# Patient Record
Sex: MALE | Race: WHITE | NOT HISPANIC OR LATINO | Employment: PART TIME | URBAN - METROPOLITAN AREA
[De-identification: names, ages, dates, MRNs, and addresses within clinical notes are randomized per-mention and may not be internally consistent; named-entity substitution may affect disease eponyms.]

---

## 2019-01-15 ENCOUNTER — OFFICE VISIT (OUTPATIENT)
Dept: MEDICAL GROUP | Facility: MEDICAL CENTER | Age: 29
End: 2019-01-15
Payer: COMMERCIAL

## 2019-01-15 VITALS
WEIGHT: 208 LBS | DIASTOLIC BLOOD PRESSURE: 70 MMHG | HEART RATE: 75 BPM | BODY MASS INDEX: 28.17 KG/M2 | RESPIRATION RATE: 16 BRPM | HEIGHT: 72 IN | SYSTOLIC BLOOD PRESSURE: 110 MMHG | OXYGEN SATURATION: 98 % | TEMPERATURE: 97.5 F

## 2019-01-15 DIAGNOSIS — J30.2 SEASONAL ALLERGIES: ICD-10-CM

## 2019-01-15 DIAGNOSIS — F33.42 RECURRENT MAJOR DEPRESSIVE DISORDER, IN FULL REMISSION (HCC): ICD-10-CM

## 2019-01-15 PROCEDURE — 99203 OFFICE O/P NEW LOW 30 MIN: CPT | Performed by: NURSE PRACTITIONER

## 2019-01-15 RX ORDER — VENLAFAXINE HYDROCHLORIDE 37.5 MG/1
37.5 CAPSULE, EXTENDED RELEASE ORAL DAILY
COMMUNITY
End: 2019-01-15 | Stop reason: SDUPTHER

## 2019-01-15 RX ORDER — VENLAFAXINE HYDROCHLORIDE 37.5 MG/1
37.5 CAPSULE, EXTENDED RELEASE ORAL DAILY
Qty: 90 CAP | Refills: 3 | Status: SHIPPED | OUTPATIENT
Start: 2019-01-15 | End: 2019-07-15 | Stop reason: SDUPTHER

## 2019-01-15 NOTE — PROGRESS NOTES
"CC: establish care/medication refill      Mirza Villatoro is a 28 y.o. male here to establish care and to discuss the evaluation and management of:    Has been several years since last PCP    Depression  Has been on Effexor for about a year an a half. Tolerates well. 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 No suicidal thoughts or ideations. No side effects from Effexor. Has not been to counseling. States he feels \"good\".'    Seasonal allergies  Stable at this time.     ROS:  Denies any Headache, Blurred Vision, Confusion, Chest pain,  Shortness of breath,  Abdominal pain, Changes of bowel or bladder, Hematuria, Hematochezia, Lower ext. edema, Fevers, Nights sweats, Weight Changes, Focal weakness or numbness.  And all other systems are negative.      Current Outpatient Prescriptions:   •  venlafaxine XR (EFFEXOR XR) 37.5 MG CAPSULE SR 24 HR, Take 1 Cap by mouth every day., Disp: 90 Cap, Rfl: 3    No Known Allergies    Past Medical History:   Diagnosis Date   • Depression     on Effexor     History reviewed. No pertinent surgical history.  Family History   Problem Relation Age of Onset   • Depression Mother    • Anxiety disorder Mother    • Other Mother         pre-diabetes   • Obesity Father    • Alzheimer's Disease Maternal Grandmother    • Diabetes Maternal Grandmother    • Heart Disease Maternal Grandfather    • No Known Problems Paternal Grandmother    • No Known Problems Paternal Grandfather      Social History     Social History   • Marital status: Single     Spouse name: N/A   • Number of children: N/A   • Years of education: N/A     Occupational History   • Not on file.     Social History Main Topics   • Smoking status: Never Smoker   • Smokeless tobacco: Never Used   • Alcohol use 0.0 oz/week      Comment: sparingly   • Drug use: No   • Sexual activity: No     Other Topics Concern   • Not on file     Social History Narrative   • No narrative on file       Objective:     Vitals: /70   Pulse 75   " Temp 36.4 °C (97.5 °F)   Resp 16   Ht 1.829 m (6')   Wt 94.3 kg (208 lb)   SpO2 98%   BMI 28.21 kg/m²      General: Alert, pleasant, NAD  HEENT:  Normocephalic. Neck supple.  No thyromegaly or masses palpated. No cervical or supraclavicular lymphadenopathy.  Heart:  Regular rate and rhythm.  S1 and S2 normal.  No murmurs appreciated.    Respiratory:  Normal respiratory effort.  Clear to auscultation bilaterally.    Skin:  Warm, dry, no rashes  Musculoskeletal:  Gait is normal.  Moves all extremities well.  Extremities:   No leg edema.  Neurological: No tremors, sensation grossly intact,   Psych:  Affect/mood is normal, judgement is good, memory is intact, grooming is appropriate.      Assessment and Plan.   28 y.o. male to establish care and discuss the following     1. Recurrent major depressive disorder, in full remission (HCC)  Stable mood. Tolerating Effexor without side effects. No thoughts of suicide. Refills provided.   - venlafaxine XR (EFFEXOR XR) 37.5 MG CAPSULE SR 24 HR; Take 1 Cap by mouth every day.  Dispense: 90 Cap; Refill: 3      2. Seasonal allergies  Controlled.         Return for Annual Preventative Exam.        Sylvia BRUNER

## 2019-07-15 RX ORDER — VENLAFAXINE HYDROCHLORIDE 37.5 MG/1
37.5 CAPSULE, EXTENDED RELEASE ORAL DAILY
Qty: 90 CAP | Refills: 3 | Status: SHIPPED | OUTPATIENT
Start: 2019-07-15
